# Patient Record
Sex: FEMALE | Race: WHITE | NOT HISPANIC OR LATINO | Employment: OTHER | URBAN - METROPOLITAN AREA
[De-identification: names, ages, dates, MRNs, and addresses within clinical notes are randomized per-mention and may not be internally consistent; named-entity substitution may affect disease eponyms.]

---

## 2017-03-07 ENCOUNTER — FOLLOW UP (OUTPATIENT)
Dept: URBAN - METROPOLITAN AREA CLINIC 26 | Facility: CLINIC | Age: 72
End: 2017-03-07

## 2017-03-07 VITALS — HEIGHT: 55 IN | DIASTOLIC BLOOD PRESSURE: 84 MMHG | SYSTOLIC BLOOD PRESSURE: 113 MMHG | HEART RATE: 69 BPM

## 2017-03-07 DIAGNOSIS — H26.491: ICD-10-CM

## 2017-03-07 DIAGNOSIS — H02.833: ICD-10-CM

## 2017-03-07 DIAGNOSIS — H43.393: ICD-10-CM

## 2017-03-07 DIAGNOSIS — H02.836: ICD-10-CM

## 2017-03-07 DIAGNOSIS — H35.371: ICD-10-CM

## 2017-03-07 DIAGNOSIS — H43.812: ICD-10-CM

## 2017-03-07 DIAGNOSIS — H35.3132: ICD-10-CM

## 2017-03-07 DIAGNOSIS — H26.492: ICD-10-CM

## 2017-03-07 DIAGNOSIS — H04.123: ICD-10-CM

## 2017-03-07 PROCEDURE — 1036F TOBACCO NON-USER: CPT

## 2017-03-07 PROCEDURE — 4177F TALK PT/CRGVR RE AREDS PREV: CPT

## 2017-03-07 PROCEDURE — 92014 COMPRE OPH EXAM EST PT 1/>: CPT

## 2017-03-07 PROCEDURE — 2019F DILATED MACUL EXAM DONE: CPT

## 2017-03-07 PROCEDURE — G8417 CALC BMI ABV UP PARAM F/U: HCPCS

## 2017-03-07 PROCEDURE — 92250 FUNDUS PHOTOGRAPHY W/I&R: CPT

## 2017-03-07 ASSESSMENT — VISUAL ACUITY
OD_CC: 20/40
OS_CC: 20/20-3

## 2017-03-07 ASSESSMENT — TONOMETRY
OS_IOP_MMHG: 8
OD_IOP_MMHG: 16

## 2018-04-30 ENCOUNTER — FOLLOW UP (OUTPATIENT)
Dept: URBAN - METROPOLITAN AREA CLINIC 26 | Facility: CLINIC | Age: 73
End: 2018-04-30

## 2018-04-30 VITALS
HEART RATE: 91 BPM | HEIGHT: 64 IN | WEIGHT: 162 LBS | BODY MASS INDEX: 27.66 KG/M2 | SYSTOLIC BLOOD PRESSURE: 143 MMHG | RESPIRATION RATE: 62 BRPM

## 2018-04-30 DIAGNOSIS — H02.836: ICD-10-CM

## 2018-04-30 DIAGNOSIS — H04.123: ICD-10-CM

## 2018-04-30 DIAGNOSIS — H43.393: ICD-10-CM

## 2018-04-30 DIAGNOSIS — H26.492: ICD-10-CM

## 2018-04-30 DIAGNOSIS — H43.813: ICD-10-CM

## 2018-04-30 DIAGNOSIS — H35.3132: ICD-10-CM

## 2018-04-30 DIAGNOSIS — H02.833: ICD-10-CM

## 2018-04-30 DIAGNOSIS — H35.371: ICD-10-CM

## 2018-04-30 PROCEDURE — 92250 FUNDUS PHOTOGRAPHY W/I&R: CPT

## 2018-04-30 PROCEDURE — 92014 COMPRE OPH EXAM EST PT 1/>: CPT

## 2018-04-30 PROCEDURE — 92134 CPTRZ OPH DX IMG PST SGM RTA: CPT

## 2018-04-30 ASSESSMENT — VISUAL ACUITY
OD_PH: 20/20-1
OS_SC: 20/25-2
OD_SC: 20/40-

## 2018-04-30 ASSESSMENT — TONOMETRY
OS_IOP_MMHG: 12
OD_IOP_MMHG: 13

## 2019-05-21 ENCOUNTER — FOLLOW UP (OUTPATIENT)
Dept: URBAN - METROPOLITAN AREA CLINIC 26 | Facility: CLINIC | Age: 74
End: 2019-05-21

## 2019-05-21 VITALS — WEIGHT: 168 LBS | HEIGHT: 64 IN | BODY MASS INDEX: 28.68 KG/M2

## 2019-05-21 DIAGNOSIS — H43.813: ICD-10-CM

## 2019-05-21 DIAGNOSIS — H02.836: ICD-10-CM

## 2019-05-21 DIAGNOSIS — H43.393: ICD-10-CM

## 2019-05-21 DIAGNOSIS — H35.3132: ICD-10-CM

## 2019-05-21 DIAGNOSIS — H04.123: ICD-10-CM

## 2019-05-21 DIAGNOSIS — H02.833: ICD-10-CM

## 2019-05-21 DIAGNOSIS — H35.371: ICD-10-CM

## 2019-05-21 PROCEDURE — 92014 COMPRE OPH EXAM EST PT 1/>: CPT

## 2019-05-21 PROCEDURE — 92134 CPTRZ OPH DX IMG PST SGM RTA: CPT

## 2019-05-21 PROCEDURE — 92235 FLUORESCEIN ANGRPH MLTIFRAME: CPT

## 2019-05-21 PROCEDURE — 92250 FUNDUS PHOTOGRAPHY W/I&R: CPT

## 2019-05-21 ASSESSMENT — VISUAL ACUITY
OD_SC: 20/25
OS_SC: 20/15

## 2019-05-21 ASSESSMENT — TONOMETRY
OS_IOP_MMHG: 12
OD_IOP_MMHG: 12

## 2020-06-23 ENCOUNTER — FOLLOW UP (OUTPATIENT)
Dept: URBAN - METROPOLITAN AREA CLINIC 26 | Facility: CLINIC | Age: 75
End: 2020-06-23

## 2020-06-23 VITALS
DIASTOLIC BLOOD PRESSURE: 88 MMHG | WEIGHT: 165 LBS | HEIGHT: 64 IN | SYSTOLIC BLOOD PRESSURE: 141 MMHG | BODY MASS INDEX: 28.17 KG/M2 | HEART RATE: 59 BPM

## 2020-06-23 DIAGNOSIS — H35.3132: ICD-10-CM

## 2020-06-23 DIAGNOSIS — H35.371: ICD-10-CM

## 2020-06-23 DIAGNOSIS — H02.836: ICD-10-CM

## 2020-06-23 DIAGNOSIS — H02.833: ICD-10-CM

## 2020-06-23 DIAGNOSIS — H04.123: ICD-10-CM

## 2020-06-23 DIAGNOSIS — H43.813: ICD-10-CM

## 2020-06-23 DIAGNOSIS — H43.393: ICD-10-CM

## 2020-06-23 PROCEDURE — 92134 CPTRZ OPH DX IMG PST SGM RTA: CPT

## 2020-06-23 PROCEDURE — 92250 FUNDUS PHOTOGRAPHY W/I&R: CPT

## 2020-06-23 PROCEDURE — 92235 FLUORESCEIN ANGRPH MLTIFRAME: CPT

## 2020-06-23 PROCEDURE — 92014 COMPRE OPH EXAM EST PT 1/>: CPT

## 2020-06-23 ASSESSMENT — VISUAL ACUITY
OS_SC: 20/15-2
OD_SC: 20/25-2

## 2020-06-23 ASSESSMENT — TONOMETRY
OD_IOP_MMHG: 13
OS_IOP_MMHG: 12

## 2021-06-08 ENCOUNTER — FOLLOW UP (OUTPATIENT)
Dept: URBAN - METROPOLITAN AREA CLINIC 26 | Facility: CLINIC | Age: 76
End: 2021-06-08

## 2021-06-08 VITALS — HEIGHT: 64 IN | WEIGHT: 164 LBS | BODY MASS INDEX: 28 KG/M2

## 2021-06-08 DIAGNOSIS — H35.371: ICD-10-CM

## 2021-06-08 DIAGNOSIS — H43.393: ICD-10-CM

## 2021-06-08 DIAGNOSIS — H43.813: ICD-10-CM

## 2021-06-08 DIAGNOSIS — H35.3132: ICD-10-CM

## 2021-06-08 PROCEDURE — 92250 FUNDUS PHOTOGRAPHY W/I&R: CPT

## 2021-06-08 PROCEDURE — 92134 CPTRZ OPH DX IMG PST SGM RTA: CPT

## 2021-06-08 PROCEDURE — 92014 COMPRE OPH EXAM EST PT 1/>: CPT

## 2021-06-08 ASSESSMENT — VISUAL ACUITY
OD_SC: 20/30+2
OS_SC: 20/15

## 2021-06-08 ASSESSMENT — TONOMETRY
OS_IOP_MMHG: 11
OD_IOP_MMHG: 10

## 2021-06-30 ENCOUNTER — OFFICE VISIT (OUTPATIENT)
Dept: URBAN - METROPOLITAN AREA CLINIC 7 | Facility: CLINIC | Age: 76
End: 2021-06-30

## 2021-07-28 ENCOUNTER — TELEPHONE ENCOUNTER (OUTPATIENT)
Dept: URBAN - METROPOLITAN AREA CLINIC 9 | Facility: CLINIC | Age: 76
End: 2021-07-28

## 2022-06-08 ENCOUNTER — FOLLOW UP (OUTPATIENT)
Dept: URBAN - METROPOLITAN AREA CLINIC 26 | Facility: CLINIC | Age: 77
End: 2022-06-08

## 2022-06-08 VITALS
BODY MASS INDEX: 27.31 KG/M2 | WEIGHT: 160 LBS | SYSTOLIC BLOOD PRESSURE: 124 MMHG | HEART RATE: 74 BPM | DIASTOLIC BLOOD PRESSURE: 86 MMHG | HEIGHT: 64 IN

## 2022-06-08 DIAGNOSIS — H43.393: ICD-10-CM

## 2022-06-08 DIAGNOSIS — H35.3132: ICD-10-CM

## 2022-06-08 DIAGNOSIS — H43.813: ICD-10-CM

## 2022-06-08 DIAGNOSIS — H35.371: ICD-10-CM

## 2022-06-08 DIAGNOSIS — H04.123: ICD-10-CM

## 2022-06-08 PROCEDURE — 92134 CPTRZ OPH DX IMG PST SGM RTA: CPT

## 2022-06-08 PROCEDURE — 92250 FUNDUS PHOTOGRAPHY W/I&R: CPT

## 2022-06-08 PROCEDURE — 92014 COMPRE OPH EXAM EST PT 1/>: CPT

## 2022-06-08 ASSESSMENT — TONOMETRY
OD_IOP_MMHG: 20
OS_IOP_MMHG: 16

## 2022-06-08 ASSESSMENT — VISUAL ACUITY
OD_SC: 20/30-1
OS_SC: 20/20-1

## 2022-07-30 ENCOUNTER — TELEPHONE ENCOUNTER (OUTPATIENT)
Age: 77
End: 2022-07-30

## 2022-07-31 ENCOUNTER — TELEPHONE ENCOUNTER (OUTPATIENT)
Age: 77
End: 2022-07-31

## 2022-09-09 ENCOUNTER — FOLLOW UP (OUTPATIENT)
Dept: URBAN - METROPOLITAN AREA CLINIC 31 | Facility: CLINIC | Age: 77
End: 2022-09-09

## 2022-09-09 DIAGNOSIS — H02.122: ICD-10-CM

## 2022-09-09 PROCEDURE — 99213 OFFICE O/P EST LOW 20 MIN: CPT

## 2022-09-09 ASSESSMENT — VISUAL ACUITY
OS_CC: J1+
OD_CC: J1+
OD_SC: 20/40
OS_SC: 20/20
OD_CC: 20/25
OS_SC: J2
OS_CC: 20/20
OD_SC: J3

## 2022-09-23 ENCOUNTER — FOLLOW UP (OUTPATIENT)
Dept: URBAN - METROPOLITAN AREA CLINIC 31 | Facility: CLINIC | Age: 77
End: 2022-09-23

## 2022-09-23 DIAGNOSIS — H02.122: ICD-10-CM

## 2022-09-23 PROCEDURE — 99213 OFFICE O/P EST LOW 20 MIN: CPT

## 2022-09-23 ASSESSMENT — VISUAL ACUITY
OD_SC: 20/40
OS_SC: 20/25

## 2023-03-29 ENCOUNTER — TELEPHONE ENCOUNTER (OUTPATIENT)
Dept: URBAN - METROPOLITAN AREA CLINIC 7 | Facility: CLINIC | Age: 78
End: 2023-03-29

## 2023-07-28 ENCOUNTER — FOLLOW UP (OUTPATIENT)
Dept: URBAN - METROPOLITAN AREA CLINIC 26 | Facility: CLINIC | Age: 78
End: 2023-07-28

## 2023-07-28 DIAGNOSIS — H43.813: ICD-10-CM

## 2023-07-28 DIAGNOSIS — H04.123: ICD-10-CM

## 2023-07-28 DIAGNOSIS — H35.3132: ICD-10-CM

## 2023-07-28 DIAGNOSIS — H35.371: ICD-10-CM

## 2023-07-28 DIAGNOSIS — H43.393: ICD-10-CM

## 2023-07-28 PROCEDURE — 92014 COMPRE OPH EXAM EST PT 1/>: CPT

## 2023-07-28 PROCEDURE — 92250 FUNDUS PHOTOGRAPHY W/I&R: CPT

## 2023-07-28 PROCEDURE — 92134 CPTRZ OPH DX IMG PST SGM RTA: CPT

## 2023-07-28 ASSESSMENT — TONOMETRY
OD_IOP_MMHG: 12
OS_IOP_MMHG: 10

## 2023-07-28 ASSESSMENT — VISUAL ACUITY
OS_SC: 20/25+2
OD_SC: 20/25

## 2023-09-07 PROBLEM — 397881000: Status: ACTIVE | Noted: 2023-09-07

## 2023-09-07 PROBLEM — 428283002: Status: ACTIVE | Noted: 2023-09-07

## 2023-09-08 ENCOUNTER — WEB ENCOUNTER (OUTPATIENT)
Dept: URBAN - METROPOLITAN AREA CLINIC 7 | Facility: CLINIC | Age: 78
End: 2023-09-08

## 2023-09-08 ENCOUNTER — OFFICE VISIT (OUTPATIENT)
Dept: URBAN - METROPOLITAN AREA CLINIC 7 | Facility: CLINIC | Age: 78
End: 2023-09-08
Payer: MEDICARE

## 2023-09-08 VITALS
SYSTOLIC BLOOD PRESSURE: 118 MMHG | BODY MASS INDEX: 27.14 KG/M2 | HEART RATE: 60 BPM | WEIGHT: 159 LBS | HEIGHT: 64 IN | TEMPERATURE: 97.7 F | DIASTOLIC BLOOD PRESSURE: 68 MMHG

## 2023-09-08 DIAGNOSIS — Z86.010 HISTORY OF COLON POLYPS: ICD-10-CM

## 2023-09-08 DIAGNOSIS — K57.90 DIVERTICULOSIS: ICD-10-CM

## 2023-09-08 DIAGNOSIS — K59.00 CONSTIPATION: ICD-10-CM

## 2023-09-08 PROCEDURE — 99214 OFFICE O/P EST MOD 30 MIN: CPT | Performed by: INTERNAL MEDICINE

## 2023-09-08 RX ORDER — LORAZEPAM 0.5 MG/1
TABLET ORAL
Qty: 30 TABLET | Status: DISCONTINUED | COMMUNITY

## 2023-09-08 RX ORDER — DEXAMETHASONE 1 MG/1
TABLET ORAL
Qty: 1 TABLET | Status: DISCONTINUED | COMMUNITY

## 2023-09-08 RX ORDER — ERYTHROMYCIN 5 MG/G
1 APPLICATION INTO THE LOWER EYELID OF AFFECTED EYE OINTMENT OPHTHALMIC AS NEEDED
Status: ACTIVE | COMMUNITY

## 2023-09-08 RX ORDER — ATENOLOL 25 MG/1
1 TABLET TABLET ORAL ONCE A DAY
Qty: 90 TABLET | Status: ACTIVE | COMMUNITY

## 2023-09-08 NOTE — HPI-TODAY'S VISIT:
Patient was last seen in the office in June 2021.  She was new to me at that time but does have a history of chronic constipation, gastroesophageal reflux disease, and a history of H. pylori.  CT November 2017 demonstrated diverticulosis without inflammation and a large hiatal hernia.  Colonoscopy November 2017 demonstrated diverticulosis and internal hemorrhoids but no polyps.  EGD and colonoscopy in 2012 demonstrated 2 tubular adenomas and H. pylori positivity.  Her labs including CBC and CMP were normal at the time of her last visit.  She was having at that time some slow onset left lower quadrant discomfort and some fecal soiling after BMs but not full-blown incontinence.  She is also been having some more constipation and continues laxatives.  She was feeling that things were pulling in her rectum and had enough to suppositories to evacuate.  Was also having associated bloating and distention as well as wiping with blood.  Pain was mild.  She has started fiber supplement in the form of Metamucil at that time.  Because of her pain symptoms I did suggest being on a bowel regimen with fiber and hydration and repeating CT imaging to rule out diverticulosis and then considering repeating her colonoscopy.  Her CT in July 2021 was negative for any bowel inflammation and was reassuring as far as no evidence of diverticulitis.  I did want her to proceed with scheduling her colonoscopy but she declined.  Follow-up now.  She has seen Dr. Fuller for hemorrhoids, but had an ext hem which was resolved. She had some rubber banding internal hemorrhoids. She still continues with irregular bowel habits, takes  Metamucil, chronic issue. Takes dulcolax stool softeners, senna but not daily, sometimes has to do rectal evacuation with suppository. All this as needed. She does not sleep well, so has bowel issues due to this.

## 2023-09-11 ENCOUNTER — TELEPHONE ENCOUNTER (OUTPATIENT)
Dept: URBAN - METROPOLITAN AREA CLINIC 7 | Facility: CLINIC | Age: 78
End: 2023-09-11

## 2023-09-15 ENCOUNTER — LAB OUTSIDE AN ENCOUNTER (OUTPATIENT)
Dept: URBAN - METROPOLITAN AREA CLINIC 7 | Facility: CLINIC | Age: 78
End: 2023-09-15

## 2023-10-11 ENCOUNTER — OFFICE VISIT (OUTPATIENT)
Dept: URBAN - METROPOLITAN AREA SURGERY CENTER 5 | Facility: SURGERY CENTER | Age: 78
End: 2023-10-11
Payer: MEDICARE

## 2023-10-11 ENCOUNTER — CLAIMS CREATED FROM THE CLAIM WINDOW (OUTPATIENT)
Dept: URBAN - METROPOLITAN AREA CLINIC 4 | Facility: CLINIC | Age: 78
End: 2023-10-11
Payer: MEDICARE

## 2023-10-11 DIAGNOSIS — Z86.010 PERSONAL HISTORY OF COLONIC POLYPS: ICD-10-CM

## 2023-10-11 DIAGNOSIS — K63.89 OTHER SPECIFIED DISEASES OF INTESTINE: ICD-10-CM

## 2023-10-11 DIAGNOSIS — K64.8 OTHER HEMORRHOIDS: ICD-10-CM

## 2023-10-11 DIAGNOSIS — Z86.010 HISTORY OF ADENOMATOUS POLYP OF COLON: ICD-10-CM

## 2023-10-11 DIAGNOSIS — K57.30 DIVERTICULOSIS OF LARGE INTESTINE WITHOUT PERFORATION OR ABSCESS WITHOUT BLEEDING: ICD-10-CM

## 2023-10-11 DIAGNOSIS — K63.5 POLYP OF ASCENDING COLON, UNSPECIFIED TYPE: ICD-10-CM

## 2023-10-11 DIAGNOSIS — K63.89 HYPERPLASTIC POLYP OF INTESTINE: ICD-10-CM

## 2023-10-11 DIAGNOSIS — D12.4 ADENOMA OF DESCENDING COLON: ICD-10-CM

## 2023-10-11 PROCEDURE — 45380 COLONOSCOPY AND BIOPSY: CPT | Performed by: CLINIC/CENTER

## 2023-10-11 PROCEDURE — 88305 TISSUE EXAM BY PATHOLOGIST: CPT | Performed by: PATHOLOGY

## 2023-10-11 PROCEDURE — 45385 COLONOSCOPY W/LESION REMOVAL: CPT | Performed by: INTERNAL MEDICINE

## 2023-10-11 PROCEDURE — 45385 COLONOSCOPY W/LESION REMOVAL: CPT | Performed by: CLINIC/CENTER

## 2023-10-11 PROCEDURE — 45380 COLONOSCOPY AND BIOPSY: CPT | Performed by: INTERNAL MEDICINE

## 2023-10-11 PROCEDURE — 00811 ANES LWR INTST NDSC NOS: CPT | Performed by: NURSE ANESTHETIST, CERTIFIED REGISTERED

## 2023-10-11 RX ORDER — ERYTHROMYCIN 5 MG/G
1 APPLICATION INTO THE LOWER EYELID OF AFFECTED EYE OINTMENT OPHTHALMIC AS NEEDED
Status: ACTIVE | COMMUNITY

## 2023-10-11 RX ORDER — ATENOLOL 25 MG/1
1 TABLET TABLET ORAL ONCE A DAY
Qty: 90 TABLET | Status: ACTIVE | COMMUNITY

## 2023-10-17 ENCOUNTER — TELEPHONE ENCOUNTER (OUTPATIENT)
Dept: URBAN - METROPOLITAN AREA CLINIC 7 | Facility: CLINIC | Age: 78
End: 2023-10-17

## 2023-11-20 ENCOUNTER — TELEPHONE ENCOUNTER (OUTPATIENT)
Dept: URBAN - METROPOLITAN AREA CLINIC 7 | Facility: CLINIC | Age: 78
End: 2023-11-20

## 2023-12-13 ENCOUNTER — LAB OUTSIDE AN ENCOUNTER (OUTPATIENT)
Dept: URBAN - METROPOLITAN AREA CLINIC 7 | Facility: CLINIC | Age: 78
End: 2023-12-13

## 2023-12-13 ENCOUNTER — OFFICE VISIT (OUTPATIENT)
Dept: URBAN - METROPOLITAN AREA CLINIC 7 | Facility: CLINIC | Age: 78
End: 2023-12-13
Payer: MEDICARE

## 2023-12-13 ENCOUNTER — DASHBOARD ENCOUNTERS (OUTPATIENT)
Age: 78
End: 2023-12-13

## 2023-12-13 VITALS
WEIGHT: 163 LBS | RESPIRATION RATE: 16 BRPM | DIASTOLIC BLOOD PRESSURE: 70 MMHG | TEMPERATURE: 97.8 F | BODY MASS INDEX: 27.83 KG/M2 | HEIGHT: 64 IN | SYSTOLIC BLOOD PRESSURE: 130 MMHG

## 2023-12-13 DIAGNOSIS — Z86.010 HISTORY OF COLON POLYPS: ICD-10-CM

## 2023-12-13 DIAGNOSIS — K86.2 PANCREATIC CYST: ICD-10-CM

## 2023-12-13 DIAGNOSIS — K59.00 CONSTIPATION: ICD-10-CM

## 2023-12-13 DIAGNOSIS — R10.32 LLQ PAIN: ICD-10-CM

## 2023-12-13 DIAGNOSIS — K57.90 DIVERTICULOSIS: ICD-10-CM

## 2023-12-13 PROBLEM — 31258000: Status: ACTIVE | Noted: 2023-12-13

## 2023-12-13 PROBLEM — 301716002: Status: ACTIVE | Noted: 2023-12-13

## 2023-12-13 PROCEDURE — 99214 OFFICE O/P EST MOD 30 MIN: CPT | Performed by: INTERNAL MEDICINE

## 2023-12-13 RX ORDER — DICYCLOMINE HYDROCHLORIDE 10 MG/1
AS DIRECTED CAPSULE ORAL
Qty: 60 | Refills: 3 | OUTPATIENT
Start: 2023-12-13 | End: 2024-04-11

## 2023-12-13 RX ORDER — ERYTHROMYCIN 5 MG/G
1 APPLICATION INTO THE LOWER EYELID OF AFFECTED EYE OINTMENT OPHTHALMIC AS NEEDED
Status: ACTIVE | COMMUNITY

## 2023-12-13 RX ORDER — BROMFENAC SODIUM 0.7 MG/ML
AS DIRECTED SOLUTION/ DROPS OPHTHALMIC
Status: ACTIVE | COMMUNITY

## 2023-12-13 RX ORDER — ATENOLOL 25 MG/1
1 TABLET TABLET ORAL ONCE A DAY
Qty: 90 TABLET | Status: ACTIVE | COMMUNITY

## 2023-12-13 RX ORDER — AMOXICILLIN AND CLAVULANATE POTASSIUM 875; 125 MG/1; MG/1
1 TABLET TABLET, FILM COATED ORAL
Qty: 20 TABLET | Refills: 0 | OUTPATIENT
Start: 2023-12-13 | End: 2023-12-23

## 2023-12-13 RX ORDER — DEXTROMETHORPHAN POLISTIREX 30 MG/5 ML
AS DIRECTED SUSPENSION, EXTENDED RELEASE 12 HR ORAL
Status: ACTIVE | COMMUNITY

## 2023-12-13 RX ORDER — UBIDECARENONE/VIT E ACET 100MG-5
AS DIRECTED CAPSULE ORAL
Status: ACTIVE | COMMUNITY

## 2023-12-13 NOTE — HPI-TODAY'S VISIT:
LO 9/2023. Patient was last seen in the office in June 2021.  She was new to me at that time but does have a history of chronic constipation, gastroesophageal reflux disease, and a history of H. pylori.  CT November 2017 demonstrated diverticulosis without inflammation and a large hiatal hernia.  Colonoscopy November 2017 demonstrated diverticulosis and internal hemorrhoids but no polyps.  EGD and colonoscopy in 2012 demonstrated 2 tubular adenomas and H. pylori positivity.  Her labs including CBC and CMP were normal at the time of her last visit.  She was having at that time some slow onset left lower quadrant discomfort and some fecal soiling after BMs but not full-blown incontinence.  She is also been having some more constipation and continues laxatives.  She was feeling that things were pulling in her rectum and had enough to suppositories to evacuate.  Was also having associated bloating and distention as well as wiping with blood.  Pain was mild.  She has started fiber supplement in the form of Metamucil at that time.  Because of her pain symptoms I did suggest being on a bowel regimen with fiber and hydration and repeating CT imaging to rule out diverticulosis and then considering repeating her colonoscopy.  Her CT in July 2021 was negative for any bowel inflammation and was reassuring as far as no evidence of diverticulitis.  I did want her to proceed with scheduling her colonoscopy but she declined. She had seen Dr. Fuller for hemorrhoids, but had an ext hem which was resolved. She had some rubber banding internal hemorrhoids. She still continues with irregular bowel habits, takes  Metamucil, chronic issue. Takes dulcolax stool softeners, senna but not daily, sometimes has to do rectal evacuation with suppository. All this as needed. She does not sleep well, so has bowel issues due to this. Last visit, plan was to arrange for surveillance colonoscopy as she was due, and to continue her bowel regimen although I did favor that she would benefit from a more routine bowel regimen.  I did favor that she had pelvic floor dysfunction as well.  Colonoscopy October 2023 demonstrated 2 small polyps, small patch of localized erythema and ulceration in the sigmoid about 25 cm proximal to the dentate line on the edge of the diverticulum suggestive of diverticular associated inflammation, moderate diverticulosis and medium size nonbleeding internal hemorrhoids.  Pathology demonstrated 1 right-sided hyperplastic polyp, 1 adenoma, and otherwise biopsies negative for inflammation.  She is seeing endocrine for hirsutism. CBC, CMP normal recently. A1C normal. MRI without contrast 10/2023 liver cysts, tiny 2-3 mm panc cyst, no lymph node enlargement. MRI pelvis looked good. She was on metamucil but stopped it due to GI irritation. She is on a bowel regimen with prune juice, kefir, activia - and she goes well with this. She is also having tenderness on the left sided, despite good bowel movements. Pinching sensation.

## 2024-01-12 ENCOUNTER — TELEPHONE ENCOUNTER (OUTPATIENT)
Dept: URBAN - METROPOLITAN AREA CLINIC 7 | Facility: CLINIC | Age: 79
End: 2024-01-12

## 2024-04-05 ENCOUNTER — FOLLOW UP (OUTPATIENT)
Dept: URBAN - METROPOLITAN AREA CLINIC 31 | Facility: CLINIC | Age: 79
End: 2024-04-05

## 2024-04-05 DIAGNOSIS — H10.401: ICD-10-CM

## 2024-04-05 DIAGNOSIS — H02.122: ICD-10-CM

## 2024-04-05 PROCEDURE — 99213 OFFICE O/P EST LOW 20 MIN: CPT

## 2024-04-05 RX ORDER — TOBRAMYCIN AND DEXAMETHASONE 1; 3 MG/ML; MG/ML: 1 SUSPENSION/ DROPS OPHTHALMIC

## 2024-04-05 ASSESSMENT — VISUAL ACUITY
OD_SC: 20/30+2
OS_SC: 20/20

## 2024-04-05 ASSESSMENT — TONOMETRY
OS_IOP_MMHG: 08
OD_IOP_MMHG: 09

## 2024-04-12 ENCOUNTER — FOLLOW UP (OUTPATIENT)
Dept: URBAN - METROPOLITAN AREA CLINIC 31 | Facility: CLINIC | Age: 79
End: 2024-04-12

## 2024-04-12 DIAGNOSIS — H02.122: ICD-10-CM

## 2024-04-12 DIAGNOSIS — H10.401: ICD-10-CM

## 2024-04-12 PROCEDURE — 99213 OFFICE O/P EST LOW 20 MIN: CPT

## 2024-04-12 ASSESSMENT — VISUAL ACUITY
OD_SC: 20/25
OS_SC: 20/20

## 2024-04-12 ASSESSMENT — TONOMETRY: OD_IOP_MMHG: 14

## 2024-10-18 ENCOUNTER — COMPREHENSIVE EXAM (OUTPATIENT)
Dept: URBAN - METROPOLITAN AREA CLINIC 31 | Facility: CLINIC | Age: 79
End: 2024-10-18

## 2024-10-18 DIAGNOSIS — H43.813: ICD-10-CM

## 2024-10-18 DIAGNOSIS — H02.122: ICD-10-CM

## 2024-10-18 DIAGNOSIS — H35.3132: ICD-10-CM

## 2024-10-18 DIAGNOSIS — H04.123: ICD-10-CM

## 2024-10-18 DIAGNOSIS — H35.371: ICD-10-CM

## 2024-10-18 PROCEDURE — 92014 COMPRE OPH EXAM EST PT 1/>: CPT

## 2024-10-18 PROCEDURE — 92250 FUNDUS PHOTOGRAPHY W/I&R: CPT

## 2024-10-18 RX ORDER — TOBRAMYCIN 3 MG/ML: 1 SOLUTION/ DROPS OPHTHALMIC

## 2024-10-18 RX ORDER — ERYTHROMYCIN 5 MG/G: OINTMENT OPHTHALMIC EVERY EVENING

## 2025-03-19 ENCOUNTER — COMPREHENSIVE EXAM (OUTPATIENT)
Age: 80
End: 2025-03-19

## 2025-03-19 VITALS — WEIGHT: 165 LBS | HEIGHT: 64 IN | BODY MASS INDEX: 28.17 KG/M2

## 2025-03-19 DIAGNOSIS — H04.123: ICD-10-CM

## 2025-03-19 DIAGNOSIS — H35.3132: ICD-10-CM

## 2025-03-19 DIAGNOSIS — H43.393: ICD-10-CM

## 2025-03-19 DIAGNOSIS — H35.371: ICD-10-CM

## 2025-03-19 DIAGNOSIS — H02.833: ICD-10-CM

## 2025-03-19 DIAGNOSIS — H02.836: ICD-10-CM

## 2025-03-19 DIAGNOSIS — H43.813: ICD-10-CM

## 2025-03-19 PROCEDURE — 92014 COMPRE OPH EXAM EST PT 1/>: CPT

## 2025-03-19 PROCEDURE — 92134 CPTRZ OPH DX IMG PST SGM RTA: CPT

## 2025-03-19 PROCEDURE — 92250 FUNDUS PHOTOGRAPHY W/I&R: CPT | Mod: 59

## 2025-03-31 ENCOUNTER — OFFICE VISIT (OUTPATIENT)
Dept: URBAN - METROPOLITAN AREA CLINIC 7 | Facility: CLINIC | Age: 80
End: 2025-03-31

## 2025-06-20 ENCOUNTER — OFFICE VISIT (OUTPATIENT)
Dept: URBAN - METROPOLITAN AREA CLINIC 7 | Facility: CLINIC | Age: 80
End: 2025-06-20
Payer: MEDICARE

## 2025-06-20 ENCOUNTER — LAB OUTSIDE AN ENCOUNTER (OUTPATIENT)
Dept: URBAN - METROPOLITAN AREA CLINIC 7 | Facility: CLINIC | Age: 80
End: 2025-06-20

## 2025-06-20 ENCOUNTER — TELEPHONE ENCOUNTER (OUTPATIENT)
Dept: URBAN - METROPOLITAN AREA CLINIC 7 | Facility: CLINIC | Age: 80
End: 2025-06-20

## 2025-06-20 DIAGNOSIS — R10.32 LLQ PAIN: ICD-10-CM

## 2025-06-20 DIAGNOSIS — Z86.0100 PERSONAL HISTORY OF COLON POLYPS, UNSPECIFIED: ICD-10-CM

## 2025-06-20 DIAGNOSIS — K92.1 MELENA: ICD-10-CM

## 2025-06-20 DIAGNOSIS — K59.00 CONSTIPATION: ICD-10-CM

## 2025-06-20 DIAGNOSIS — K86.2 PANCREATIC CYST: ICD-10-CM

## 2025-06-20 DIAGNOSIS — Z86.718 HISTORY OF DVT (DEEP VEIN THROMBOSIS): ICD-10-CM

## 2025-06-20 DIAGNOSIS — K57.90 DIVERTICULOSIS: ICD-10-CM

## 2025-06-20 PROCEDURE — 99214 OFFICE O/P EST MOD 30 MIN: CPT | Performed by: INTERNAL MEDICINE

## 2025-06-20 RX ORDER — HYDROXYZINE HYDROCHLORIDE 10 MG/1
TAKE 1 TABLET BY MOUTH EVERY 6 HOURS AS NEEDED FOR ANXIETY TABLET ORAL
Qty: 30 EACH | Refills: 0 | Status: ACTIVE | COMMUNITY

## 2025-06-20 RX ORDER — BROMFENAC SODIUM 0.7 MG/ML
AS DIRECTED SOLUTION/ DROPS OPHTHALMIC
Status: ACTIVE | COMMUNITY

## 2025-06-20 RX ORDER — TOPIRAMATE 25 MG/1
TABLET, FILM COATED ORAL
Qty: 60 TABLET | Status: DISCONTINUED | COMMUNITY

## 2025-06-20 RX ORDER — GLYCERIN 2 G/1
1 SUPPOSITORY AS NEEDED SUPPOSITORY RECTAL ONCE A DAY
Status: ACTIVE | COMMUNITY

## 2025-06-20 RX ORDER — ATENOLOL 25 MG/1
1 TABLET TABLET ORAL ONCE A DAY
Qty: 90 TABLET | Status: DISCONTINUED | COMMUNITY

## 2025-06-20 RX ORDER — UBIDECARENONE/VIT E ACET 100MG-5
AS DIRECTED CAPSULE ORAL
Status: ACTIVE | COMMUNITY

## 2025-06-20 RX ORDER — ERYTHROMYCIN 5 MG/G
1 APPLICATION INTO THE LOWER EYELID OF AFFECTED EYE OINTMENT OPHTHALMIC AS NEEDED
Status: DISCONTINUED | COMMUNITY

## 2025-06-20 RX ORDER — DOCUSATE SODIUM 100 MG
1 CAPSULE AS NEEDED CAPSULE ORAL ONCE A DAY
Status: ACTIVE | COMMUNITY

## 2025-06-20 RX ORDER — APIXABAN 2.5 MG/1
AS DIRECTED TABLET, FILM COATED ORAL TWICE A DAY
Status: ACTIVE | COMMUNITY

## 2025-06-20 RX ORDER — CARVEDILOL 12.5 MG/1
1 TABLET WITH FOOD TABLET, FILM COATED ORAL TWICE A DAY
Qty: 180 TABLET | Status: ACTIVE | COMMUNITY

## 2025-06-20 RX ORDER — CLONIDINE HYDROCHLORIDE 0.1 MG/1
TAKE 1/2 TABLET BY MOUTH IN THE MORNING, AND 1 FULL TABLET IN THE EVENING TABLET ORAL
Qty: 90 EACH | Refills: 1 | Status: ACTIVE | COMMUNITY

## 2025-06-20 RX ORDER — DEXTROMETHORPHAN POLISTIREX 30 MG/5 ML
AS DIRECTED SUSPENSION, EXTENDED RELEASE 12 HR ORAL
Status: ACTIVE | COMMUNITY

## 2025-06-20 RX ORDER — TOBRAMYCIN 3 MG/ML
PLACE 1 DROP IN THE AFFECTED EYE(S) 4 TIMES A DAY SOLUTION OPHTHALMIC
Qty: 5 MILLILITER | Refills: 0 | Status: ACTIVE | COMMUNITY

## 2025-06-20 NOTE — HPI-TODAY'S VISIT:
LO 12/2023. Patient was last seen in the office in June 2021.  She was new to me at that time but does have a history of chronic constipation, gastroesophageal reflux disease, and a history of H. pylori.  CT November 2017 demonstrated diverticulosis without inflammation and a large hiatal hernia.  Colonoscopy November 2017 demonstrated diverticulosis and internal hemorrhoids but no polyps.  EGD and colonoscopy in 2012 demonstrated 2 tubular adenomas and H. pylori positivity.  Her labs including CBC and CMP were normal at the time of her last visit.  She was having at that time some slow onset left lower quadrant discomfort and some fecal soiling after BMs but not full-blown incontinence.  She is also been having some more constipation and continues laxatives.  She was feeling that things were pulling in her rectum and had enough to suppositories to evacuate.  Was also having associated bloating and distention as well as wiping with blood.  Pain was mild.  She has started fiber supplement in the form of Metamucil at that time.  Because of her pain symptoms I did suggest being on a bowel regimen with fiber and hydration and repeating CT imaging to rule out diverticulosis and then considering repeating her colonoscopy.  Her CT in July 2021 was negative for any bowel inflammation and was reassuring as far as no evidence of diverticulitis.  I did want her to proceed with scheduling her colonoscopy but she declined. She had seen Dr. Fuller for hemorrhoids, but had an ext hem which was resolved. She had some rubber banding internal hemorrhoids. She still continues with irregular bowel habits, takes  Metamucil, chronic issue. Takes dulcolax stool softeners, senna but not daily, sometimes has to do rectal evacuation with suppository. All this as needed. She does not sleep well, so has bowel issues due to this. Last visit, plan was to arrange for surveillance colonoscopy as she was due, and to continue her bowel regimen although I did favor that she would benefit from a more routine bowel regimen.  I did favor that she had pelvic floor dysfunction as well.  Colonoscopy October 2023 demonstrated 2 small polyps, small patch of localized erythema and ulceration in the sigmoid about 25 cm proximal to the dentate line on the edge of the diverticulum suggestive of diverticular associated inflammation, moderate diverticulosis and medium size nonbleeding internal hemorrhoids.  Pathology demonstrated 1 right-sided hyperplastic polyp, 1 adenoma, and otherwise biopsies negative for inflammation. She was seeing endocrine for hirsutism. CBC, CMP normal recently. A1C normal. MRI without contrast 10/2023 liver cysts, tiny 2-3 mm panc cyst, no lymph node enlargement. MRI pelvis looked good. She was on metamucil but stopped it due to GI irritation. She is on a bowel regimen with prune juice, kefir, activia - and she goes well with this. She is also having tenderness on the left sided, despite good bowel movements. Pinching sensation. Plan was CT pancreas with contrast imaging, given non-contrast imaging, and will repeat MRI pancreas cyst in 1 year for surveillance, but no alarm symptoms. Given her LLQ discomfort, and some mild inflammatory changes seen on colon from 10/2023, will use dicyclomine prn as needed for pain, and course of augmentin for presumed diverticular inflammation.  CT of the pancreas done in January 2024 with contrast demonstrated that the 2 mm cystic lesions that were seen in the pancreas on her prior MRI were not well-visualized on the CT of the pancreas.  Gallbladder appeared normal without any biliary dilation.  She did have a large hiatal hernia containing the proximal stomach and no evidence of bowel inflammation, no lymphadenopathy.  MRI was suggested in 12 to 24 months for surveillance. She tells me she ultimately had a pancreatic evaluation with an oncologist. She had a DVT noted on a hospital stay for elevated BP, started on Eliquis (was on this for 3 months). MRI 4/2025 6.6 x 4.8 mm (was 3.1 mm), another cyst with 9.5 mm in size, no nodules, no enhancement. No atrophy. Large HH. GB normal, normal CBD. She had some darker stools. Recent CMP normal, normal LFTs. Coag testing negative. No bood thinners now.